# Patient Record
Sex: FEMALE
[De-identification: names, ages, dates, MRNs, and addresses within clinical notes are randomized per-mention and may not be internally consistent; named-entity substitution may affect disease eponyms.]

---

## 2019-10-04 ENCOUNTER — HOSPITAL ENCOUNTER (INPATIENT)
Dept: HOSPITAL 92 - L&D | Age: 24
LOS: 3 days | Discharge: HOME | End: 2019-10-07
Attending: OBSTETRICS & GYNECOLOGY | Admitting: OBSTETRICS & GYNECOLOGY
Payer: COMMERCIAL

## 2019-10-04 VITALS — BODY MASS INDEX: 38.9 KG/M2

## 2019-10-04 DIAGNOSIS — Z3A.39: ICD-10-CM

## 2019-10-04 DIAGNOSIS — F17.200: ICD-10-CM

## 2019-10-04 DIAGNOSIS — Z23: ICD-10-CM

## 2019-10-04 LAB
HBSAG INDEX: 0.14 S/CO (ref 0–0.99)
HGB BLD-MCNC: 11.3 G/DL (ref 12–16)
MCH RBC QN AUTO: 28.7 PG (ref 27–31)
MCV RBC AUTO: 85.4 FL (ref 78–98)
PLATELET # BLD AUTO: 257 THOU/UL (ref 130–400)
RBC # BLD AUTO: 3.93 MILL/UL (ref 4.2–5.4)
SYPHILIS ANTIBODY INDEX: 0.08 S/CO
WBC # BLD AUTO: 11.1 THOU/UL (ref 4.8–10.8)

## 2019-10-04 PROCEDURE — 3E02340 INTRODUCTION OF INFLUENZA VACCINE INTO MUSCLE, PERCUTANEOUS APPROACH: ICD-10-PCS | Performed by: OBSTETRICS & GYNECOLOGY

## 2019-10-04 PROCEDURE — 85027 COMPLETE CBC AUTOMATED: CPT

## 2019-10-04 PROCEDURE — 86850 RBC ANTIBODY SCREEN: CPT

## 2019-10-04 PROCEDURE — G0008 ADMIN INFLUENZA VIRUS VAC: HCPCS

## 2019-10-04 PROCEDURE — 90686 IIV4 VACC NO PRSV 0.5 ML IM: CPT

## 2019-10-04 PROCEDURE — 86901 BLOOD TYPING SEROLOGIC RH(D): CPT

## 2019-10-04 PROCEDURE — 36415 COLL VENOUS BLD VENIPUNCTURE: CPT

## 2019-10-04 PROCEDURE — 90471 IMMUNIZATION ADMIN: CPT

## 2019-10-04 PROCEDURE — 86900 BLOOD TYPING SEROLOGIC ABO: CPT

## 2019-10-04 PROCEDURE — 86780 TREPONEMA PALLIDUM: CPT

## 2019-10-04 PROCEDURE — 87340 HEPATITIS B SURFACE AG IA: CPT

## 2019-10-04 RX ADMIN — Medication SCH: at 21:04

## 2019-10-04 RX ADMIN — SIMETHICONE PRN MG: 80 TABLET, CHEWABLE ORAL at 14:16

## 2019-10-04 NOTE — PDOC.LDHP
Labor and Delivery H&P


Chief complaint: scheduled  section


HPI: 





Pt is a 23yo G1 @ 39+ weeks here for 1CS for breech presentation, declines ECV.


Current gestational age (weeks): 39


Due date: 10/10/19


Dating criteria: first trimester ultrasound


Grav: 1


Para: 0


Current pregnancy complications: none, breech


Abnormal US findings: No


Past Medical History: 





none


Current medications: pre-janine vitamins


Previous surgical history: other (left leg)


Allergies/Adverse Reactions: 


 Allergies











Allergy/AdvReac Type Severity Reaction Status Date / Time


 


No Known Allergies Allergy   Verified 10/04/19 05:35











Social history: tobacco use (8 years of use), none





- Physical Exam


Abnormal vital signs: elevated BP before CS


General: NAD


Heart: RRR


Lungs: CTAB


Abdomen: gravid


Extremeties: no edema


FHT: category 1





- OB Labs


Blood type: A


RH: positive


Antibody Screen: negative


HIV: negative


RPR: negative


HEPSAg: negative


1 hour GCT: negative


GBS: negative


Urine drug screen: negative


Rubella: immune





- Assessment


L&D Assessment: scheduled primary  section





- Plan


Plan: admit to L&D, informed consent obtained, anesthesia consult for pain 

management


-: 





Scheduled 1CS for breech presentation.

## 2019-10-04 NOTE — OP
DATE OF PROCEDURE:  10/04/2019



PREOPERATIVE DIAGNOSES:  

1. A 24-year-old G1 at 39 weeks and 1 day.

2. Breech presentation, declines external cephalic version.



POSTOPERATIVE DIAGNOSES:  Status post primary low transverse  section.



PROCEDURE PERFORMED:  Primary low transverse  section.



ASSISTANT:  Brenda Rodriguez DO



COMPLICATIONS:  None.



ESTIMATED BLOOD LOSS:  500 mL.



QUANTITATIVE BLOOD LOSS:  Pending.



ANESTHESIA:  Spinal per Dr. Wills.



COMPLICATIONS:  None.



OPERATIVE FINDINGS:  

1. Low-transverse hysterotomy without extension.

2. Complete breech presentation.

3. Normal-appearing uterus, tubes, and ovaries bilaterally.

4. Low transverse hysterotomy without extension.

5. Surgical site is hemostatic.

6. Amniotic fluid clear.



DESCRIPTION OF PROCEDURE:  The patient was taken back to the OR with IV fluids

running.  When she was in the ER, spinal anesthesia was obtained and the patient was

placed in dorsal supine position with a left lateral tilt.  A Hernandez catheter was

placed using sterile technique.  Sequential compression devices were placed on the

lower extremities and 2 g of Ancef was administered.  The abdomen was prepped and

draped in normal fashion for  section.  The surgeons were gowned and gloved.

 Anesthesia was tested and found to be adequate.  A Pfannenstiel skin incision was

made with a scalpel.  The skin incision was carried down through subcutaneous tissue

to the fascia.  Once the fascia was reached, it was incised in the midline and

extended superolaterally using curved Gutiérrez scissors.  Kocher clamps were placed at

the superior border of the fascia, which was sharply and bluntly dissected off the

rectus abdominis muscles in both caudad and cephalad directions allowing adequate

space for delivery of the infant.  The rectus muscles and peritoneum were bluntly

entered in the midline and stretched laterally.  An Jina O retractor was placed

into the peritoneal cavity for retraction, visualization, and protection of the

wound.  A bladder flap was created at the uterus with Metzenbaums and dissected away

from the planned hysterotomy site.  A low-transverse hysterotomy was made with a

scalpel.  The hysterotomy was extended using the Parker maneuver and amniotomy was

performed with clear fluid noted.  The infant's buttock was noted at the hysterotomy

site and with gentle fundal pressure, the infant's buttock, back, and lower

extremities were spontaneously delivered.  The left upper extremity was delivered by

internally rotating and sweeping the upper extremity across the body.  The

contralateral upper extremity delivered spontaneously with rotation and the head

delivered spontaneously as well.  The triple nuchal cord was noted and was reduced.

The cord was then doubly clamped and cut, and the infant was handed off to special

care nurses in attendance.  Cord blood was collected.  The placenta was delivered.

The uterus was exteriorized, massaged to firm and cleared of clot and debris.

Uterus was then returned to the abdominal cavity.  Hysterotomy was inspected and no

extension was noted.  The hysterotomy was closed with Monocryl suture in a running

locked fashion.  The fundus was noted to be firm.  The hysterotomy and paracolic

gutters were irrigated and suctioned dry.  The hysterotomy was inspected again with

no bleeding noted.  The Jian O retractor was removed from the abdominal cavity.

The rectus muscles and fascia were inspected, but no bleeding noted.  The rectus

fascia was reapproximated from corner to corner and tied separately in the midline

using PDS suture.  The subcutaneous tissue was then irrigated and dried.  Any small

areas of bleeding were controlled with Bovie cauterization.  Plain gut suture was

used to reapproximate the subcutaneous layer.  The subcuticular layer was closed

with 4-0 Monocryl and dressed with Dermabond dressing.  The patient's uterine fundus

palpated firm at the end of the case.  The counts were correct x2. 







Job ID:  [id number]

## 2019-10-04 NOTE — PDOC.OPDEL
OB Operative/Delivery Note


Delivery Dr/Surgeon: Lawson


Assist: Jennifer


Pre-Delivery Diagnosis: scheduled  section (breech presentation)


Procedure/Post Delivery Dx: primary low transverse CS


Weeks gestation: 39





- Findings


  ** A


Sex: male


Weight: 6 lb 12 oz


Apgar - 1 min: 8


Apgar - 5 min: 9





- Additional Findings/Plan


Placenta delivered: spontaneous


 findings: low transverse hysterotomy without extension, normal uterus, 

normal tubes, normal ovaries


Estimated blood loss: 500ml


Compilations/Other Findings: 


julián breech, triple nuchal 


Post delivery plan: routine recovery

## 2019-10-05 LAB
HGB BLD-MCNC: 9.8 G/DL (ref 12–16)
MCH RBC QN AUTO: 29.3 PG (ref 27–31)
MCV RBC AUTO: 86.5 FL (ref 78–98)
PLATELET # BLD AUTO: 211 THOU/UL (ref 130–400)
RBC # BLD AUTO: 3.35 MILL/UL (ref 4.2–5.4)
WBC # BLD AUTO: 10.1 THOU/UL (ref 4.8–10.8)

## 2019-10-05 RX ADMIN — HYDROCODONE BITARTRATE AND ACETAMINOPHEN PRN TAB: 5; 325 TABLET ORAL at 11:29

## 2019-10-05 RX ADMIN — Medication SCH: at 21:19

## 2019-10-05 RX ADMIN — Medication SCH: at 08:57

## 2019-10-05 RX ADMIN — SIMETHICONE PRN MG: 80 TABLET, CHEWABLE ORAL at 11:30

## 2019-10-05 NOTE — PDOC.PP
Post Partum Progress Note


Post Partum Day #: 1 CS for Breech


Subjective: 





Doing well, states dressing still on wound (not quite 24 hrs postop yet)


PO intake tolerated: yes


Flatus: yes


Ambulation: yes


 Vital Signs (12 hours)











  Temp Pulse Resp BP Pulse Ox


 


 10/05/19 03:45  98.1 F  99  18  124/68 


 


 10/05/19 00:10  98.3 F  95  18  129/63  95


 


 10/04/19 20:30      96


 


 10/04/19 19:48  98.2 F  107 H  12  132/79  96








 Weight











Weight                         220 lb

















- Physical Examination


General: NAD


Abdominal: lochia, no distention, appropriately TTP


Extremities: negative homans (B)


Skin: CS incision dry & intact, no rash


Neurological: no gross focal deficits


Psychiatric: A&Ox3, normal affect


Result Diagrams: 


 10/05/19 05:57





Additional Labs: 


 Post Partum Labs











Blood Type  A POSITIVE   10/04/19  06:32    


 


Hep Bs Antigen  Non-Reactive S/CO (NonReactive)   10/04/19  06:04    











(1) Breech presentation


Code(s): O32.1XX0 - MATERNAL CARE FOR BREECH PRESENTATION, UNSP   Status: Acute

   





(2)  delivery delivered


Code(s): O82 - ENCOUNTER FOR  DELIVERY WITHOUT INDICATION   Status: 

Acute   





- Assessment/Plan





POD 1 s/p breech CS...doing well:


dressing off at 24 hrs (this AM). Encouraged ambulation, SCDs in bed.


Clinically well.


Prob DC to home POD 2 or 3.


Routine PP (postop) care

## 2019-10-06 RX ADMIN — HYDROCODONE BITARTRATE AND ACETAMINOPHEN PRN TAB: 5; 325 TABLET ORAL at 05:24

## 2019-10-06 RX ADMIN — Medication SCH: at 09:03

## 2019-10-06 RX ADMIN — HYDROCODONE BITARTRATE AND ACETAMINOPHEN PRN TAB: 5; 325 TABLET ORAL at 12:38

## 2019-10-06 NOTE — PDOC.PP
Post Partum Progress Note


Post Partum Day #: POD2


Subjective: 


Resting, no c/o.


PO intake tolerated: yes


Flatus: yes


Ambulation: yes


 Vital Signs (12 hours)











  Temp Pulse Resp BP Pulse Ox


 


 10/06/19 05:22  98.2 F  90  18  145/92 H 


 


 10/06/19 00:06  97.8 F  82  18  136/80 


 


 10/05/19 19:40  98.3 F  104 H  18  140/65  99








 Weight











Weight                         99.79 kg

















- Physical Examination


General: NAD


Respiratory: non-labored breathing


Abdominal: no distention


Skin: CS incision dry & intact


Psychiatric: normal affect


Result Diagrams: 


 10/05/19 05:57





Additional Labs: 


 Post Partum Labs











Blood Type  A POSITIVE   10/04/19  06:32    


 


Hep Bs Antigen  Non-Reactive S/CO (NonReactive)   10/04/19  06:04    














- Assessment/Plan


Doing well.


Advance diet.


Ambulate.


Anticipate home 10/7/19

## 2019-10-07 VITALS — SYSTOLIC BLOOD PRESSURE: 130 MMHG | TEMPERATURE: 98.2 F | DIASTOLIC BLOOD PRESSURE: 71 MMHG

## 2019-10-07 RX ADMIN — Medication SCH: at 09:09

## 2019-10-07 RX ADMIN — HYDROCODONE BITARTRATE AND ACETAMINOPHEN PRN TAB: 5; 325 TABLET ORAL at 09:12

## 2019-10-07 NOTE — PDOC.PP
Post Partum Progress Note


Post Partum Day #: 3


Subjective: 





doing well, no concerns, minimal discomfort, nursing and bottle feeding 


PO intake tolerated: yes


Flatus: yes


Ambulation: yes


 Weight











Weight                         220 lb

















- Physical Examination


General: NAD


Respiratory: non-labored breathing


Abdominal: no distention


Skin: CS incision dry & intact


Psychiatric: A&Ox3, normal affect


Result Diagrams: 


 10/05/19 05:57





Additional Labs: 


 Post Partum Labs











Blood Type  A POSITIVE   10/04/19  06:32    


 


Hep Bs Antigen  Non-Reactive S/CO (NonReactive)   10/04/19  06:04    











(1) Breech presentation


Code(s): O32.1XX0 - MATERNAL CARE FOR BREECH PRESENTATION, UNSP   Status: Acute

   





(2)  delivery delivered


Code(s): O82 - ENCOUNTER FOR  DELIVERY WITHOUT INDICATION   Status: 

Acute   





- Assessment/Plan





POD3 doing well, recovery criteria met, plan for DC today.